# Patient Record
Sex: MALE | Race: WHITE | Employment: FULL TIME | URBAN - METROPOLITAN AREA
[De-identification: names, ages, dates, MRNs, and addresses within clinical notes are randomized per-mention and may not be internally consistent; named-entity substitution may affect disease eponyms.]

---

## 2022-09-08 ENCOUNTER — HOSPITAL ENCOUNTER (OUTPATIENT)
Dept: GENERAL RADIOLOGY | Age: 47
Discharge: HOME | End: 2022-09-08
Attending: INTERNAL MEDICINE
Payer: COMMERCIAL

## 2022-09-08 ENCOUNTER — TRANSCRIBE ORDER (OUTPATIENT)
Dept: REGISTRATION | Age: 47
End: 2022-09-08

## 2022-09-08 DIAGNOSIS — J45.50 SEVERE PERSISTENT ASTHMA: ICD-10-CM

## 2022-09-08 DIAGNOSIS — J45.50 SEVERE PERSISTENT ASTHMA: Primary | ICD-10-CM

## 2022-09-08 PROCEDURE — 71046 X-RAY EXAM CHEST 2 VIEWS: CPT

## 2022-10-14 ENCOUNTER — OFFICE VISIT (OUTPATIENT)
Dept: NEUROLOGY | Age: 47
End: 2022-10-14
Payer: COMMERCIAL

## 2022-10-14 VITALS — SYSTOLIC BLOOD PRESSURE: 118 MMHG | WEIGHT: 283 LBS | DIASTOLIC BLOOD PRESSURE: 82 MMHG

## 2022-10-14 DIAGNOSIS — R20.0 FACIAL NUMBNESS: ICD-10-CM

## 2022-10-14 DIAGNOSIS — G44.009 MIGRAINE-CLUSTER HEADACHE SYNDROME: Primary | ICD-10-CM

## 2022-10-14 PROCEDURE — 99205 OFFICE O/P NEW HI 60 MIN: CPT | Performed by: NURSE PRACTITIONER

## 2022-10-14 RX ORDER — ALBUTEROL SULFATE 0.83 MG/ML
SOLUTION RESPIRATORY (INHALATION)
COMMUNITY
Start: 2022-10-04

## 2022-10-14 RX ORDER — LAMOTRIGINE 100 MG/1
TABLET ORAL
COMMUNITY

## 2022-10-14 RX ORDER — LOSARTAN POTASSIUM 100 MG/1
TABLET ORAL
COMMUNITY
Start: 2022-09-26

## 2022-10-14 RX ORDER — MONTELUKAST SODIUM 10 MG/1
10 TABLET ORAL
COMMUNITY
Start: 2022-08-11

## 2022-10-14 RX ORDER — ALBUTEROL SULFATE 90 UG/1
AEROSOL, METERED RESPIRATORY (INHALATION)
COMMUNITY
Start: 2022-10-04

## 2022-10-14 RX ORDER — BUDESONIDE AND FORMOTEROL FUMARATE DIHYDRATE 160; 4.5 UG/1; UG/1
AEROSOL RESPIRATORY (INHALATION)
COMMUNITY
Start: 2022-09-28

## 2022-10-14 RX ORDER — DULOXETIN HYDROCHLORIDE 20 MG/1
20 CAPSULE, DELAYED RELEASE ORAL DAILY
COMMUNITY

## 2022-10-14 RX ORDER — SERTRALINE HYDROCHLORIDE 100 MG/1
TABLET, FILM COATED ORAL
COMMUNITY
Start: 2022-08-16

## 2022-10-14 NOTE — PROGRESS NOTES
Pt having cluster HA started about 6 months ago  Will have sever Head pains lasting weeks at a time then will have weeks with none   Shooting pains right sided, poss auras, vision seems to be declining, will take OC meds but with minimal effect

## 2022-10-17 NOTE — PROGRESS NOTES
Mitzy Benson is a 40 y.o. male who presents with the following  Chief Complaint   Patient presents with    New Patient     Sever RODRIGUEZ       HPI    New patient for severe migraines  About 6 months ago he started to have significant migraines  They are located in the front right side of his head  They will cluster around the front eye  They are so debilitating he does not even want to leave the house  There is shooting pain right side of the head he has tearing of the eyes and congestion in the face  He had a normal CT  Has not had an MRI  Has not had a sed rate  He is on Lamictal, Cozaar, Zoloft, and Cymbalta  He does continue to have problems with his migraines  He has about 8 days a month  Lasted about 4 hours or longer  The pain is debilitating  Nothing has helped    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 lamoTRIgine (LaMICtal) 100 mg tablet TAKE 1 TABLET BY MOUTH EVERY DAY FOR 30 DAYS    losartan (COZAAR) 100 mg tablet TAKE 1 TABLET BY MOUTH EVERY DAY AS DIRECTED    montelukast (SINGULAIR) 10 mg tablet Take 10 mg by mouth nightly. sertraline (ZOLOFT) 100 mg tablet TAKE 1 TABLET BY MOUTH EVERY DAY FOR 90 DAYS    albuterol (PROVENTIL VENTOLIN) 2.5 mg /3 mL (0.083 %) nebu      No current facility-administered medications for this visit. Social History     Tobacco Use   Smoking Status Not on file   Smokeless Tobacco Not on file       No past medical history on file. No past surgical history on file. No family history on file.     Social History     Socioeconomic History    Marital status:  Review of Systems   Eyes:  Positive for blurred vision, double vision and photophobia. Respiratory:  Negative for shortness of breath and wheezing. Cardiovascular:  Negative for chest pain and palpitations. Gastrointestinal:  Positive for nausea. Negative for vomiting. Neurological:  Positive for dizziness and headaches. Negative for tingling, seizures and loss of consciousness. Remainder of comprehensive review is negative. Physical Exam :    Visit Vitals  /82 (BP 1 Location: Left upper arm, BP Patient Position: Sitting, BP Cuff Size: Large adult)   Wt 128.4 kg (283 lb)       General: Well defined, nourished, and groomed individual in no acute distress. Neck: Supple, nontender, no bruits, no pain with resistance to active range of motion. Heart: Regular rate and rhythm, no murmurs, rub, or gallop. Normal S1S2. Lungs: Clear to auscultation bilaterally with equal chest expansion, no cough, no wheeze  Musculoskeletal: Extremities revealed no edema and had full range of motion of joints. Psych: Good mood and bright affect    NEUROLOGICAL EXAMINATION:    Mental Status: Alert and oriented to person, place, and time    Cranial Nerves:    II, III, IV, VI: Visual acuity grossly intact. Visual fields are normal.    Pupils are equal, round, and reactive to light and accommodation. Extra-ocular movements are full and fluid. Fundoscopic exam was benign, no ptosis or nystagmus. V-XII: Hearing is grossly intact. Facial features are symmetric, with normal sensation and strength. The palate rises symmetrically and the tongue protrudes midline. Sternocleidomastoids 5/5. Motor Examination: Normal tone, bulk, and strength, 5/5 muscle strength throughout. Coordination: Finger to nose was normal. No resting or intention tremor    Gait and Station: Steady while walking. Normal arm swing. No pronator drift. No muscle wasting or fasiculations noted.      Reflexes: DTRs 2+ throughout. No results found for this or any previous visit. Orders Placed This Encounter    MRI BRAIN W WO CONT     Standing Status:   Future     Standing Expiration Date:   11/14/2023     Order Specific Question:   STAT Creatinine as indicated     Answer:   Yes    SED RATE (ESR)     Standing Status:   Future     Number of Occurrences:   1     Standing Expiration Date:   10/14/2023    albuterol (PROVENTIL HFA, VENTOLIN HFA, PROAIR HFA) 90 mcg/actuation inhaler    budesonide-formoteroL (SYMBICORT) 160-4.5 mcg/actuation HFAA     Sig: INHALE 2 PUFFS BY MOUTH TWICE A DAY    DULoxetine (CYMBALTA) 20 mg capsule     Sig: Take 20 mg by mouth daily. lamoTRIgine (LaMICtal) 100 mg tablet     Sig: TAKE 1 TABLET BY MOUTH EVERY DAY FOR 30 DAYS    losartan (COZAAR) 100 mg tablet     Sig: TAKE 1 TABLET BY MOUTH EVERY DAY AS DIRECTED    montelukast (SINGULAIR) 10 mg tablet     Sig: Take 10 mg by mouth nightly. sertraline (ZOLOFT) 100 mg tablet     Sig: TAKE 1 TABLET BY MOUTH EVERY DAY FOR 90 DAYS    albuterol (PROVENTIL VENTOLIN) 2.5 mg /3 mL (0.083 %) nebu       1. Migraine-cluster headache syndrome    2.  Facial numbness      We will get an MRI of the brain to rule out stroke tumor or lesion mass  We will get a sed rate to rule out temporal arteritis  We will give him some Nurtec and Onzetra to use as needed for now until we evaluate closer  Track symptoms and call if none of these work          This note will not be viewable in [address]